# Patient Record
Sex: MALE | Race: WHITE | URBAN - METROPOLITAN AREA
[De-identification: names, ages, dates, MRNs, and addresses within clinical notes are randomized per-mention and may not be internally consistent; named-entity substitution may affect disease eponyms.]

---

## 2017-12-14 LAB
INFLUENZA A ANTIGEN, POC: NEGATIVE
INFLUENZA B ANTIGEN, POC: NEGATIVE
RAPID GROUP A STREP (OHS): POSITIVE

## 2019-10-18 ENCOUNTER — HISTORICAL (OUTPATIENT)
Dept: ADMINISTRATIVE | Facility: HOSPITAL | Age: 74
End: 2019-10-18

## 2020-01-03 ENCOUNTER — HISTORICAL (OUTPATIENT)
Dept: ADMINISTRATIVE | Facility: HOSPITAL | Age: 75
End: 2020-01-03

## 2020-01-03 LAB
INFLUENZA A ANTIGEN, POC: NEGATIVE
INFLUENZA B ANTIGEN, POC: NEGATIVE

## 2022-04-11 ENCOUNTER — HISTORICAL (OUTPATIENT)
Dept: ADMINISTRATIVE | Facility: HOSPITAL | Age: 77
End: 2022-04-11

## 2022-04-25 VITALS
SYSTOLIC BLOOD PRESSURE: 127 MMHG | WEIGHT: 164.88 LBS | OXYGEN SATURATION: 97 % | DIASTOLIC BLOOD PRESSURE: 76 MMHG | HEIGHT: 65 IN | BODY MASS INDEX: 27.47 KG/M2

## 2022-05-05 NOTE — HISTORICAL [facility]ER
This is a historical note converted from [employer] [facility]. Formatting and pictures may have been removed.  Please reference [facility] for original formatting and attached multimedia. Chief Complaint  3 days sinus pressure, mucous, cough, yesterday fever 101.8 at hightest  History of Present Illness  74-year-old male who presents for evaluation of?sinus pressure,?cough and congestion for approximately?3 days.? He admits to a fever?with a T-max of 1 1.8 ?F yesterday.? He has been afebrile today. ?Denies any nausea, vomiting or diarrhea.? Patient states that the cough is productive?of yellow sputum.  Review of Systems  Constitutional_no fever, no weakness  Eye_no eye redness, drainage, or pain  ENMT_sinus pressure, congestion, mucus production  Respiratory_no wheezing, no shortness of breath, cough  Cardiovascular_no chest pain  Gastrointestinal_no nausea, vomiting, or diarrhea.  Musculoskeletal_no muscle or joint pain, no joint swelling  Integumentary_no skin rash or abnormal lesion  Physical Exam  Vitals & Measurements  T:?36.9? ?C (Oral)? HR:?61(Peripheral)? RR:?17? BP:?127/76? SpO2:?97%?  HT:?165?cm? WT:?74.8?kg? BMI:?27.47?  General_well-developed well-nourished in no acute distress  Eye_ clear conjunctiva, eyelids normal  HENT_oropharynx mild erythema, moist oral mucosa, no exudate or swelling, TMs clear, no maxillary or frontal sinus tenderness  Neck_full range of motion, no anterior cervical lymphadenopathy  Respiratory_fine crackles in lung bases bilaterally, non-labored respirations, symmetric chest rise  Cardiovascular_regular rate and rhythm without murmurs, gallops or rubs  Integumentary_no rashes or skin lesions present  ?  Assessment/Plan  Acute URI?J06.9  ?Chest x-ray negative for acute cardiopulmonary process  Appears to be increased?lung markings bilaterally  Patient and significant other request?antibiotic and cortisone injection  Celestone 6 mg IM and?Rocephin 1 g IM given in clinic  Will also cover with  azithromycin as prescribed  Ibuprofen or Tylenol for fever/pain as needed  Increase fluid intake  Suggest taking combination guaifenesin and/or dextromethorphan medication for significant coughing, Mucinex DM for example  Watch for worsening symptoms and contact this clinic or return for any significant problems  Side effects of cortisone can include:  -jitteriness, insomnia, flushing, palpitations, gastritis/heartburn, and elevated blood sugar in diabetic patients  Typically lasts approximately 12-24 hours for most cortisone injections and vary with the type and dose of cortisone given  Ordered:  betamethasone, 6 mg, IM, Once-Unscheduled, first dose 01/03/20 13:28:00 CST  cefTRIAXone, 1 gm, IM, Once-Unscheduled, first dose 01/03/20 13:28:00 CST  Office/Outpatient Visit Level 3 Established [id number] [facility] [facility] [place]03[place] 13:31:00 CST  ?  Orders:  azithromycin, = 1 packet(s), Oral, Daily, as directed on package labeling, X 5 day(s), # 6 tab(s), 0 Refill(s), Pharmacy: [facility]/pharmacy #[id number], 165, cm, Height/Length Dosing, 01/03/20 12:27:00 CST, 74.8, kg, Weight Dosing, 01/03/20 12:27:00 CST   Problem List/Past Medical History  Ongoing  DM - Diabetes mellitus  High cholesterol  HT - Hypertension  Historical  No qualifying data  Procedure/Surgical History  None   Medications  ALENDRONATE SODIUM 70 MG TAB  Aspirin Low Dose 81 mg oral delayed release tablet, 81 mg= 1 tab(s), Oral, Daily  cefTRIAXone, 1 gm, IM, Once-Unscheduled  Celestone Soluspan, 6 mg, IM, Once-Unscheduled  FINASTERIDE 5 MG TABLET  GLIMEPIRIDE 2 MG TABLET  METFORMIN  MG TABLET  pravastatin 40 mg oral tablet, 40 mg= 1 tab(s), Oral, Daily  ProAir HFA 90 mcg/inh inhalation aerosol with adapter, 2 puff(s), INH, q4hr, PRN  TAMSULOSIN HCL 0.4 MG CAPSULE  Allergies  No Known Allergies  No Known Medication Allergies  Social History  Abuse/Neglect  No, 10/18/2019  Alcohol  Never, 12/14/2017  Substance Use  Never, 12/14/2017  Tobacco  Never (less than 100  in lifetime), N/A, 10/18/2019  Family History  Family history is negative  Lab Results  Test Name Test Result Date/Time   Influenza A POC Negative 01/03/2020 12:33 CST   Influenza B POC Negative 01/03/2020 12:33 CST

## 2022-05-05 NOTE — HISTORICAL [facility]
This is a historical note converted from [facility]. Formatting and pictures may have been removed.  Please reference [facility] for original formatting and attached multimedia. Chief Complaint  Bruising to lower back and right buttock post fall on Monday. Bruising onset last PM. Also hit head at time of fall  History of Present Illness  74-year-old male?fell?4 days prior?with concern of?bruising to the lower back and right buttock.?Patient went to sit on a high chair?and ended up?hitting his lower back buttock onto the chair and?hit the back of his head?on the wall. No LOC, no severe HA, no NV.? No bladder or bowel incontinence. Not on any blood thinners.  Review of Systems  Constitutional_negative for fever  ENMT_negative for rhinorrhea, sinus pressure, sore throat,?blurry vision or change in vision  Respiratory_negative for?cough, SOB  Gastrointestinal_negative for nausea or vomiting  Integumentary_pos ecchymosis to lower back and buttock  Physical Exam  Vitals & Measurements  T:?36.6? ?C (Oral)? HR:?67(Peripheral)? RR:?16? BP:?141/77? SpO2:?100%?  HT:?165?cm? WT:?75?kg? BMI:?27.55?  Gen: WD NAD  CV: S1S2  Extr: no CCE  Integument: Positive ecchymoses throughout the right?lower hip?and?central sacrum?coccyx area, no pain with flexion of the hips bilaterally  Psych: Cooperative, approp mood and affect  Assessment/Plan  1.?Coccyx pain?M53.3  ?X-ray of the?sacrum and coccyx done today, per my review no fractures seen.  Will also call with final report.  Ordered:  betamethasone, 6 mg, IM, Once-Unscheduled, first dose 10/18/19 12:29:00 CDT  ketorolac, 10 mg = 1 tab(s), Oral, BID, PRN PRN for pain, X 5 day(s), # 10 tab(s), 0 Refill(s), Pharmacy: [facility]/pharmacy #[id number]  XR Sacrum Coccyx, Routine, 10/18/19 11:54:00 CDT, Pain, None, Ambulatory, Rad Type, Coccyx pain, Not Scheduled, 10/18/19 11:54:00 CDT  ?  2.?Ecchymosis?R58  ?Injection of Celestone given today.? Can elevate blood sugars over the?next few  days.?  Ordered:  betamethasone, 6 mg, IM, Once-Unscheduled, first dose 10/18/19 12:29:00 CDT  ketorolac, 10 mg = 1 tab(s), Oral, BID, PRN PRN for pain, X 5 day(s), # 10 tab(s), 0 Refill(s), Pharmacy: [facility]/pharmacy #[id number]  ?  3.?Coccyx sprain?S33.8XXA  Stretch, Toradol twice a day with food.  ?   Problem List/Past Medical History  Ongoing  DM - Diabetes mellitus  High cholesterol  HT - Hypertension  Historical  No qualifying data  Procedure/Surgical History  None   Medications  ALENDRONATE SODIUM 70 MG TAB  Aspirin Low Dose 81 mg oral delayed release tablet, 81 mg= 1 tab(s), Oral, Daily  FINASTERIDE 5 MG TABLET  GLIMEPIRIDE 2 MG TABLET  METFORMIN  MG TABLET  pravastatin 40 mg oral tablet, 40 mg= 1 tab(s), Oral, Daily  ProAir HFA 90 mcg/inh inhalation aerosol with adapter, 2 puff(s), INH, q4hr, PRN  TAMSULOSIN HCL 0.4 MG CAPSULE  Allergies  No Known Allergies  No Known Medication Allergies  Social History  Abuse/Neglect  No, 10/18/2019  Alcohol  Never, 12/14/2017  Substance Use  Never, 12/14/2017  Tobacco  Never (less than 100 in lifetime), N/A, 10/18/2019  Family History  Family history is negative  Health Maintenance  Health Maintenance  ???Pending?(in the next year)  ??? ??OverDue  ??? ? ? ?Coronary Artery Disease Maintenance-Antiplatelet Agent Prescribed due??and every?  ??? ? ? ?Coronary Artery Disease Maintenance-Lipid Lowering Therapy due??and every?  ??? ? ? ?Pneumococcal Vaccine due??and every?  ??? ? ? ?Diabetes Maintenance-Eye Exam due??11/01/17??and every 1??year(s)  ??? ? ? ?Alcohol Misuse Screening due??01/01/19??and every 1??year(s)  ??? ? ? ?Cognitive Screening due??01/01/19??and every 1??year(s)  ??? ? ? ?Functional Assessment due??01/01/19??and every 1??year(s)  ??? ? ? ?Geriatric Depression Screening due??01/01/19??and every 1??year(s)  ??? ? ? ?Pneumococcal Vaccine due??07/22/19??and every 1??day(s)  ??? ??Due?  ??? ? ? ?ADL Screening due??10/18/19??and every 1??year(s)  ??? ? ? ?Diabetes  Maintenance-Serum Creatinine due??10/18/19??Variable frequency  ??? ? ? ?Diabetes Maintenance-Microalbumin due??10/18/19??Variable frequency  ??? ? ? ?Diabetes Maintenance-Urine Dipstick due??10/18/19??Variable frequency  ??? ? ? ?Diabetes Maintenance-Foot Exam due??10/18/19??and every?  ??? ? ? ?Hypertension Management-Education due??10/18/19??and every 1??year(s)  ??? ? ? ?Tetanus Vaccine due??10/18/19??and every 10??year(s)  ??? ? ? ?Zoster Vaccine due??10/18/19??and every 100??year(s)  ??? ??Due In Future?  ??? ? ? ?Advance Directive not due until??01/01/20??and every 1??year(s)  ??? ? ? ?Fall Risk Assessment not due until??01/01/20??and every 1??year(s)  ??? ? ? ?Obesity Screening not due until??01/01/20??and every 1??year(s)  ??? ? ? ?Smoking Cessation (Diabetes) not due until??01/08/20??and every 2??year(s)  ??? ? ? ?Smoking Cessation (Coronary Artery Disease) not due until??01/08/20??and every 2??year(s)  ??? ? ? ?Diabetes Maintenance-Fasting Lipid Profile not due until??05/22/20??and every 1??year(s)  ??? ? ? ?Hypertension Management-BMP not due until??05/22/20??and every 1??year(s)  ??? ? ? ?Diabetes Maintenance-HgbA1c not due until??06/05/20??and every 1??year(s)  ??? ? ? ?Hypertension Management-Blood Pressure not due until??10/17/20??and every 1??year(s)  ???Satisfied?(in the past 1 year)  ??? ??Satisfied?  ??? ? ? ?Advance Directive on??07/21/19.??Satisfied by Colton Delaney  ??? ? ? ?Aspirin Therapy for CVD Prevention on??10/18/19.  ??? ? ? ?Blood Pressure Screening on??10/18/19.??Satisfied by Romero Llamas LPN  ??? ? ? ?Body Mass Index Check on??10/18/19.??Satisfied by Romero Llamas LPN  ??? ? ? ?Coronary Artery Disease Maintenance-Antiplatelet Agent Prescribed on??10/18/19.  ??? ? ? ?Fall Risk Assessment on??10/18/19.??Satisfied by Romero Llamas LPN  ??? ? ? ?Hypertension Management-Blood Pressure on??10/18/19.??Satisfied by Romero Llamas LPN  ??? ? ? ?Obesity  Screening on??10/18/19.??Satisfied by Farhad DOUGLAS, Romero  ??? ? ? ?Pneumococcal Vaccine on??07/21/19.??Satisfied by Marielos NUÑEZ, Colton GREEN  ?

## 2022-09-21 ENCOUNTER — HISTORICAL (OUTPATIENT)
Dept: ADMINISTRATIVE | Facility: HOSPITAL | Age: 77
End: 2022-09-21